# Patient Record
Sex: MALE | Race: WHITE | URBAN - METROPOLITAN AREA
[De-identification: names, ages, dates, MRNs, and addresses within clinical notes are randomized per-mention and may not be internally consistent; named-entity substitution may affect disease eponyms.]

---

## 2017-01-14 ENCOUNTER — GENERIC CONVERSION - ENCOUNTER (OUTPATIENT)
Dept: OTHER | Facility: OTHER | Age: 67
End: 2017-01-14

## 2017-04-07 ENCOUNTER — GENERIC CONVERSION - ENCOUNTER (OUTPATIENT)
Dept: OTHER | Facility: OTHER | Age: 67
End: 2017-04-07

## 2017-04-07 ENCOUNTER — ALLSCRIPTS OFFICE VISIT (OUTPATIENT)
Dept: OTHER | Facility: OTHER | Age: 67
End: 2017-04-07

## 2017-04-08 LAB
A/G RATIO (HISTORICAL): 2.5 (ref 1.2–2.2)
ALBUMIN SERPL BCP-MCNC: 4.5 G/DL (ref 3.6–4.8)
ALP SERPL-CCNC: 72 IU/L (ref 39–117)
ALT SERPL W P-5'-P-CCNC: 15 IU/L (ref 0–44)
AST SERPL W P-5'-P-CCNC: 14 IU/L (ref 0–40)
BILIRUB SERPL-MCNC: 0.4 MG/DL (ref 0–1.2)
BUN SERPL-MCNC: 13 MG/DL (ref 8–27)
BUN/CREA RATIO (HISTORICAL): 16 (ref 10–24)
CALCIUM SERPL-MCNC: 9.2 MG/DL (ref 8.6–10.2)
CHLORIDE SERPL-SCNC: 104 MMOL/L (ref 96–106)
CHOLEST SERPL-MCNC: 192 MG/DL (ref 100–199)
CO2 SERPL-SCNC: 19 MMOL/L (ref 18–29)
CREAT SERPL-MCNC: 0.83 MG/DL (ref 0.76–1.27)
EGFR AFRICAN AMERICAN (HISTORICAL): 105 ML/MIN/1.73
EGFR-AMERICAN CALC (HISTORICAL): 90 ML/MIN/1.73
GLUCOSE SERPL-MCNC: 99 MG/DL (ref 65–99)
HDLC SERPL-MCNC: 52 MG/DL
LDLC SERPL CALC-MCNC: 124 MG/DL (ref 0–99)
POTASSIUM SERPL-SCNC: 4.2 MMOL/L (ref 3.5–5.2)
SODIUM SERPL-SCNC: 142 MMOL/L (ref 134–144)
TOT. GLOBULIN, SERUM (HISTORICAL): 1.8 G/DL (ref 1.5–4.5)
TOTAL PROTEIN (HISTORICAL): 6.3 G/DL (ref 6–8.5)
TRIGL SERPL-MCNC: 78 MG/DL (ref 0–149)
VLDLC SERPL CALC-MCNC: 16 MG/DL (ref 5–40)

## 2017-04-11 LAB
EST. AVERAGE GLUCOSE BLD GHB EST-MCNC: 117 MG/DL
HBA1C MFR BLD HPLC: 5.7 %HB

## 2017-04-12 LAB
BASOPHILS # BLD AUTO: NORMAL %
BASOPHILS # BLD AUTO: NORMAL X10E3/UL
DEPRECATED RDW RBC AUTO: 14.1 % (ref 12.3–15.4)
DIFF COMMENT (HISTORICAL): NORMAL
EOSINOPHIL # BLD AUTO: NORMAL %
EOSINOPHIL # BLD AUTO: NORMAL X10E3/UL
HCT VFR BLD AUTO: 38.2 % (ref 37.5–51)
HGB BLD-MCNC: 13.3 G/DL (ref 12.6–17.7)
LYMPHOCYTES # BLD AUTO: NORMAL %
LYMPHOCYTES # BLD AUTO: NORMAL X10E3/UL
MCH RBC QN AUTO: 30.2 PG (ref 26.6–33)
MCHC RBC AUTO-ENTMCNC: 34.8 G/DL (ref 31.5–35.7)
MCV RBC AUTO: 87 FL (ref 79–97)
MONOCYTES # BLD AUTO: NORMAL X10E3/UL
MONOCYTES (HISTORICAL): NORMAL %
NEUTROPHILS # BLD AUTO: NORMAL %
NEUTROPHILS # BLD AUTO: NORMAL X10E3/UL
PLATELET # BLD AUTO: 269 X10E3/UL (ref 150–379)
PLATELET COMMENT (HISTORICAL): NORMAL
QUESTION/PROBLEM (HISTORICAL): NORMAL
RBC (HISTORICAL): 4.4 X10E6/UL (ref 4.14–5.8)
RBC COMMENT (HISTORICAL): NORMAL
WBC # BLD AUTO: 4.8 X10E3/UL (ref 3.4–10.8)

## 2017-08-11 ENCOUNTER — ALLSCRIPTS OFFICE VISIT (OUTPATIENT)
Dept: OTHER | Facility: OTHER | Age: 67
End: 2017-08-11

## 2017-12-22 ENCOUNTER — LAB REQUISITION (OUTPATIENT)
Dept: LAB | Facility: HOSPITAL | Age: 67
End: 2017-12-22
Payer: COMMERCIAL

## 2017-12-22 ENCOUNTER — ALLSCRIPTS OFFICE VISIT (OUTPATIENT)
Dept: OTHER | Facility: OTHER | Age: 67
End: 2017-12-22

## 2017-12-22 DIAGNOSIS — R73.9 HYPERGLYCEMIA: ICD-10-CM

## 2017-12-22 DIAGNOSIS — Z12.5 ENCOUNTER FOR SCREENING FOR MALIGNANT NEOPLASM OF PROSTATE: ICD-10-CM

## 2017-12-22 DIAGNOSIS — I10 ESSENTIAL (PRIMARY) HYPERTENSION: ICD-10-CM

## 2017-12-22 DIAGNOSIS — E78.49 OTHER HYPERLIPIDEMIA: ICD-10-CM

## 2017-12-22 LAB
ALBUMIN SERPL BCP-MCNC: 4.2 G/DL (ref 3.5–5)
ALP SERPL-CCNC: 72 U/L (ref 46–116)
ALT SERPL W P-5'-P-CCNC: 23 U/L (ref 12–78)
ANION GAP SERPL CALCULATED.3IONS-SCNC: 6 MMOL/L (ref 4–13)
AST SERPL W P-5'-P-CCNC: 15 U/L (ref 5–45)
BILIRUB SERPL-MCNC: 0.69 MG/DL (ref 0.2–1)
BUN SERPL-MCNC: 13 MG/DL (ref 5–25)
CALCIUM SERPL-MCNC: 9.1 MG/DL (ref 8.3–10.1)
CHLORIDE SERPL-SCNC: 107 MMOL/L (ref 100–108)
CHOLEST SERPL-MCNC: 180 MG/DL (ref 50–200)
CO2 SERPL-SCNC: 27 MMOL/L (ref 21–32)
CREAT SERPL-MCNC: 0.88 MG/DL (ref 0.6–1.3)
EST. AVERAGE GLUCOSE BLD GHB EST-MCNC: 131 MG/DL
GFR SERPL CREATININE-BSD FRML MDRD: 88 ML/MIN/1.73SQ M
GLUCOSE SERPL-MCNC: 103 MG/DL (ref 65–140)
HBA1C MFR BLD: 6.2 % (ref 4.2–6.3)
HDLC SERPL-MCNC: 58 MG/DL (ref 40–60)
LDLC SERPL CALC-MCNC: 108 MG/DL (ref 0–100)
POTASSIUM SERPL-SCNC: 4.7 MMOL/L (ref 3.5–5.3)
PROT SERPL-MCNC: 7.1 G/DL (ref 6.4–8.2)
PSA SERPL-MCNC: 0.7 NG/ML (ref 0–4)
SODIUM SERPL-SCNC: 140 MMOL/L (ref 136–145)
TRIGL SERPL-MCNC: 70 MG/DL
TSH SERPL DL<=0.05 MIU/L-ACNC: 1.19 UIU/ML (ref 0.36–3.74)

## 2017-12-22 PROCEDURE — 80053 COMPREHEN METABOLIC PANEL: CPT | Performed by: FAMILY MEDICINE

## 2017-12-22 PROCEDURE — G0103 PSA SCREENING: HCPCS | Performed by: FAMILY MEDICINE

## 2017-12-22 PROCEDURE — 83036 HEMOGLOBIN GLYCOSYLATED A1C: CPT | Performed by: FAMILY MEDICINE

## 2017-12-22 PROCEDURE — 80061 LIPID PANEL: CPT | Performed by: FAMILY MEDICINE

## 2017-12-22 PROCEDURE — 84443 ASSAY THYROID STIM HORMONE: CPT | Performed by: FAMILY MEDICINE

## 2017-12-23 NOTE — PROGRESS NOTES
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)   2  Benign essential hypertension (401 1) (I10)   3  Elevated blood sugar (790 29) (R73 9)    Plan   Benign essential hypertension, Elevated blood sugar, Encounter for prostate cancer    screening, Familial combined hyperlipidemia    · (1) PSA (SCREEN) (Dx V76 44 Screen for Prostate Cancer); Status:Active; Requested    for:01Yhm0746;   Benign essential hypertension, Elevated blood sugar, Familial combined hyperlipidemia    · (1) COMPREHENSIVE METABOLIC PANEL; Status:Active; Requested for:80Irk1050;    · (1) HEMOGLOBIN A1C; Status:Active; Requested for:56Mop4404;    · (1) LIPID PANEL FASTING W DIRECT LDL REFLEX; Status:Active; Requested    for:97Lfr8418;    · (1) TSH; Status:Active; Requested for:88Npx5960;   Need for vaccination    · Pneumo (Pneumovax); INJECT 0 5  ML Intramuscular; To Be Done:    71Mer4491  Screening for depression    · *VB-Depression Screening; Status:Complete - Retrospective By Protocol Authorization;      Done: 94OZL5629 10:45AM  Screening for other and unspecified genitourinary condition    · *VB - Urinary Incontinence Screen (Dx Z13 89 Screen for UI); Status:Complete -    Retrospective By Protocol Authorization;   Done: 78YUB9464 10:45AM  Special screening for other neurological conditions    · *VB - Fall Risk Assessment  (Dx Z13 89 Screen for Neurologic Disorder);    Status:Complete - Retrospective By Protocol Authorization;   Done: 74FLX7462 10:44AM    Chief Complaint   Pt presents for 6 month check up and FBW  Pt would like flu shot today  Pt is UTD with Colonoscopy  Patient is here today for follow up of chronic conditions described in HPI  History of Present Illness   Patient presents for routine follow-up of chronic medical problems which include hypertension, hyperlipidemia, and elevated blood glucose   Patient takes amlodipine and lisinopril for his blood pressure, atorvastatin for his lipids and Wellbutrin for smoking cessation  He is on no medication for blood sugar  He is compliant with his medications and has no side effects related to them  Review of Systems      Over the past 2 weeks, how often have you been bothered by the following problems? 1 ) Little interest or pleasure in doing things? Not at all       2 ) Feeling down, depressed or hopeless? Not at all       3 ) Trouble falling asleep or sleeping too much? Not at all       4 ) Feeling tired or having little energy? Not at all       5 ) Poor appetite or overeating? Not at all       6 ) Feeling bad about yourself, or that you are a failure, or have let yourself or your family down? Not at all       7 ) Trouble concentrating on things, such as reading a newspaper or watching television? Not at all       8 ) Moving or speaking so slowly that other people could have noticed, or the opposite, moving or speaking faster than usual? Not at all       9 ) Thoughts that you would be better off dead or of hurting yourself in some way? 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)   5  Flu vaccine need (V04 81) (Z23)   6  Multiple contusions (924 8) (T07  XXXA)   7  Smoking trying to quit (305 1) (Z72 0)    Past Medical History   1  History of Abnormal blood chemistry (790 6) (R79 9)   2  History of Anal Fistula (565 1)   3   History of Encounter for prostate cancer screening (V76 44) (Z12 5)   4  History of dermatitis (V13 3) (Z87 2)   5  History of influenza vaccination (V49 89) (Z92 29)   6  History of sciatica (V12 49) (Z86 69)   7  History of Limb pain (729 5) (M79 609)   8  History of Medicare annual wellness visit, initial (V70 0) (Z00 00)   9  History of Medicare annual wellness visit, subsequent (V70 0) (Z00 00)   10  History of Muscle weakness (728 87) (M62 81)   11  History of Neck pain (723 1) (M54 2)   12  History of Need for pneumococcal vaccination (V03 82) (Z23)   13  History of Need for revaccination (V05 9) (Z23)   14  History of Need for revaccination (V05 9) (Z23)   15  History of Open wound of left foot, initial encounter (892 0) (S91 302A)   16  History of Open wound of left foot, subsequent encounter (V58 89,892 0) (S91 302D)   17  History of Other muscle spasm (728 85) (M62 838)   18  History of Screening for colon cancer (V76 51) (Z12 11)   19  History of Screening for colon cancer (V76 51) (Z12 11)   20  History of Screening for depression (V79 0) (Z13 89)   21  History of Screening for depression (V79 0) (Z13 89)   22  History of Screening for depression (V79 0) (Z13 89)   23  History of Screening for genitourinary condition (V81 6) (Z13 89)   24  History of Screening for neurological condition (V80 09) (Z13 89)   25  History of Special screening for other neurological conditions (V80 09) (Z13 89)   26  History of Visit for pre-operative examination (V72 84) (B28 725)     The active problems and past medical history were reviewed and updated today  Family History   Mother    1  No pertinent family history  Father    2  No pertinent family history  Child    3  No pertinent family history    Social History    · Always uses seat belt   · Feels safe at home   · Never A Smoker   · Smoking trying to quit (305 1) (Z72 0)   · Social alcohol use (Z78 9)  The social history was reviewed and updated today   The social history was reviewed and is unchanged  Current Meds    1  AmLODIPine Besylate 5 MG Oral Tablet; Take 1 tablet daily; Therapy: 66MEU6249 to (Hailey Soria)  Requested for: 49ENS6601; Last     VR:48ZBI1004 Ordered   2  Atorvastatin Calcium 10 MG Oral Tablet; Take 1 tablet daily; Therapy: 30OIJ2793 to (Evaluate:92Qal4796)  Requested for: 35YHI1351; Last     Rx:28Feu0624 Ordered   3  BuPROPion HCl - 75 MG Oral Tablet; TAKE 1 TABLET TWICE DAILY; Therapy: 17HCS2323 to (Evaluate:42Wdj6705)  Requested for: 29UBG3086; Last     Rx:03Gon0320 Ordered   4  Lisinopril 5 MG Oral Tablet; TAKE 1 TABLET DAILY; Therapy: 47XFJ2357 to (Hailey Soria)  Requested for: 96YHO7528; Last     ZX:50BCP3781 Ordered     The medication list was reviewed and updated today  Allergies   1  No Known Drug Allergies    Vitals   Vital Signs    Recorded: 06Cry9696 10:39AM   Temperature 97 5 F, Tympanic   Heart Rate 75   Pulse Quality Normal   Respiration Quality Normal   Respiration 16   Systolic 785, LUE, Sitting   Diastolic 80, LUE, Sitting   BP CUFF SIZE Large   Height 5 ft 7 72 in   Weight 254 lb 7 oz   BMI Calculated 39 01   BSA Calculated 2 26   O2 Saturation 98, RA   Pain Scale 0     Physical Exam        Constitutional      General appearance: No acute distress, well appearing and well nourished  Head and Face      Head and face: Normal        Eyes      Conjunctiva and lids: No erythema, swelling or discharge  Pupils and irises: Equal, round, reactive to light  Ears, Nose, Mouth, and Throat      External inspection of ears and nose: Normal        Otoscopic examination: Tympanic membranes translucent with normal light reflex  Canals patent without erythema  Hearing: Normal        Nasal mucosa, septum, and turbinates: Normal without edema or erythema  Lips, teeth, and gums: Normal, good dentition  Oropharynx: Normal with no erythema, edema, exudate or lesions         Neck      Neck: Supple, symmetric, trachea midline, no masses  Thyroid: Normal, no thyromegaly  Pulmonary      Respiratory effort: No increased work of breathing or signs of respiratory distress  Auscultation of lungs: Clear to auscultation  Cardiovascular      Auscultation of heart: Normal rate and rhythm, normal S1 and S2, no murmurs  Examination of extremities for edema and/or varicosities: Normal        Abdomen      Abdomen: Non-tender, no masses  Lymphatic      Palpation of lymph nodes in neck: No lymphadenopathy  Musculoskeletal      Gait and station: Normal        Inspection/palpation of digits and nails: Normal without clubbing or cyanosis  Inspection/palpation of joints, bones, and muscles: Normal        Range of motion: Normal        Stability: Normal        Muscle strength/tone: Normal        Neurologic      Cranial nerves: Cranial nerves 2-12 intact  Psychiatric      Judgment and insight: Normal        Orientation to person, place and time: Normal        Recent and remote memory: Intact         Mood and affect: Normal        Results/Data   *VB - Urinary Incontinence Screen (Dx Z13 89 Screen for UI) 63Hlg8900 10:45AM Louellen Cerna      Test Name Result Flag Reference   Urinary Incontinence Assessment 64GPQ9055        *VB-Depression Screening 76HPO2213 10:45AM Louellen Cerna      Test Name Result Flag Reference   Depression Scale Result      Depression Screen - Negative For Symptoms      *VB - Fall Risk Assessment  (Dx Z13 89 Screen for Neurologic Disorder) 90Xsr1488 10:44AM Louellen Cerna      Test Name Result Flag Reference   Falls Risk      No falls in the past year        Signatures    Electronically signed by : Saray Chairez DO; Dec 22 2017 11:14AM EST                       (Author)

## 2017-12-26 ENCOUNTER — GENERIC CONVERSION - ENCOUNTER (OUTPATIENT)
Dept: OTHER | Facility: OTHER | Age: 67
End: 2017-12-26

## 2018-01-10 NOTE — PROGRESS NOTES
Assessment    1  Open wound of left foot, subsequent encounter (V58 89,892 0) (B41 174Z)    Discussion/Summary    Patient is here to recheck his wound on his left heel  It is totally closed, just a little pink  Ok to leave open to air, but recommend he always wear a sock in his shoe  The treatment plan was reviewed with the patient/guardian  The patient/guardian understands and agrees with the treatment plan      Chief Complaint    1  Foot Problem  pt here for a f/u to the wound on his left foot  states it is better  History of Present Illness  Patient is here to check wound on left heel  Catheryn Files presents with complaints of foot problem  Associated symptoms include foot skin lesions  Review of Systems    Constitutional: No fever or chills, feels well, no tiredness, no recent weight gain or weight loss  Integumentary: No complaints of skin rash or skin lesions, no itching, no skin wound, no dry skin  Active Problems    1  Benign essential hypertension (401 1) (I10)   2  Benign prostatic hypertrophy (600 00) (N40 0)   3  Elevated blood sugar (790 29) (R73 09)   4  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)   5  Multiple contusions (924 8) (T14 8)   6  Open wound of left foot, initial encounter (892 0) (S91 302A)   7  Open wound of left foot, subsequent encounter (V58 89,892 0) (V81 759Q)    Past Medical History    1  History of Abnormal blood chemistry (790 6) (R79 9)   2  History of Anal Fistula (565 1)   3  History of Encounter for prostate cancer screening (V76 44) (Z12 5)   4  History of dermatitis (V13 3) (Z87 2)   5  History of sciatica (V12 49) (Z86 69)   6  History of Medicare annual wellness visit, initial (V70 0) (Z00 00)   7  History of Muscle weakness (728 87) (M62 81)   8  History of Need for pneumococcal vaccination (V03 82) (Z23)   9  History of Other muscle spasm (728 85) (M62 838)   10  History of Screening for colon cancer (V76 51) (Z12 11)   11   History of Screening for depression (V79 0) (Z13 89)   12  History of Screening for neurological condition (V80 09) (Z13 89)   13  History of Visit for pre-operative examination (V72 84) (V92 641)    The active problems and past medical history were reviewed and updated today  Family History    1  No pertinent family history    2  No pertinent family history    Social History    · Never A Smoker  The social history was reviewed and updated today  The social history was reviewed and is unchanged  Current Meds   1  AmLODIPine Besylate 5 MG Oral Tablet; Take 1 tablet daily; Therapy: 12SFJ3017 to (Yuniel Garcia)  Requested for: 44LPI6345; Last   Rx:01Nov2015 Ordered   2  Atorvastatin Calcium 10 MG Oral Tablet; Take 1 tablet daily; Therapy: 35XAF6021 to (Evaluate:29Apr2016)  Requested for: 03NHC5532; Last   Rx:01Nov2015 Ordered   3  Mupirocin Calcium 2 % External Cream;   Therapy: 61KQJ5426 to (Last Rx:86Tva5296)  Requested for: 08ZVY9896 Ordered    The medication list was reviewed and updated today  Allergies    1  No Known Drug Allergies    Vitals  Vital Signs [Data Includes: Current Encounter]    Recorded: 27HGH4672 01:13PM   Temperature 98 3 F, Tympanic   Heart Rate 87   Respiration 18   Systolic 707   Diastolic 88   Height 5 ft 6 5 in   Weight 253 lb 8 96 oz   BMI Calculated 40 31   BSA Calculated 2 22   O2 Saturation 97     Physical Exam    Constitutional   General appearance: No acute distress, well appearing and well nourished  Skin   Skin and subcutaneous tissue: Abnormal   Wound is closed, but still pink     Psychiatric   Orientation to person, place and time: Normal     Mood and affect: Normal          Future Appointments    Date/Time Provider Specialty Site   01/28/2016 06:00 PM Rik Garcia   02/12/2016 12:45 PM Phylicia Cuello DO Family Medicine [address]     Signatures   Electronically signed by : James Richmond DO; Francisco Javier 15 2016  1:40PM EST                       (Author)

## 2018-01-10 NOTE — MISCELLANEOUS
Message  I tried to call patient regarding a note that he left for me yesterday at the office  He had seen a psychiatrist with the [facility] in [place]  They prescribed bupropion for his anxiety  He wanted to check if I was okay with him taking this medication  I did leave a message on his voicemail that I am okay with this medication but I will try to call him again on Tuesday, I am not in the office tomorrow        Signatures   Electronically signed by : Mandie Bowden DO; Dec  4 2016  1:20PM EST                       (Author)

## 2018-01-12 VITALS
WEIGHT: 246.25 LBS | TEMPERATURE: 96.1 F | HEIGHT: 68 IN | DIASTOLIC BLOOD PRESSURE: 70 MMHG | BODY MASS INDEX: 37.32 KG/M2 | HEART RATE: 62 BPM | SYSTOLIC BLOOD PRESSURE: 118 MMHG | RESPIRATION RATE: 17 BRPM | OXYGEN SATURATION: 98 %

## 2018-01-14 VITALS
SYSTOLIC BLOOD PRESSURE: 122 MMHG | TEMPERATURE: 99 F | HEART RATE: 74 BPM | OXYGEN SATURATION: 98 % | WEIGHT: 248.06 LBS | BODY MASS INDEX: 39.87 KG/M2 | HEIGHT: 66 IN | RESPIRATION RATE: 16 BRPM | DIASTOLIC BLOOD PRESSURE: 80 MMHG
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
## 2018-01-14 NOTE — RESULT NOTES
Verified Results  ([facility] [facility]) Lipid Panel With LDL/HDL Ratio 52PXE1002 12:00AM Jose Juan Garcia     Test Name Result Flag Reference   Cholesterol, Total 156 mg/dL  100-199   Triglycerides 63 mg/dL  0-149   HDL Cholesterol 57 mg/dL  >39   According to ATP-III Guidelines, HDL-C >59 mg/dL is considered a  negative risk factor for CHD  VLDL Cholesterol Delon 13 mg/dL  5-40   LDL Cholesterol Calc 86 mg/dL  0-99   LDL/HDL Ratio 1 5 ratio units  0 0-3 6   LDL/HDL Ratio                                                             Men  Women                                               1/2 Avg  Risk  1 0    1 5                                                   Avg Risk  3 6    3 2                                                2X Avg  Risk  6 2    5 0                                                3X Avg  Risk  8 0    6 1

## 2018-01-17 NOTE — RESULT NOTES
Verified Results  (1) COMPREHENSIVE METABOLIC PANEL 43SCS6140 56:25ZD Eliana Guardado     Test Name Result Flag Reference   Glucose, Serum 124 mg/dL H 65-99   BUN 11 mg/dL  8-27   Creatinine, Serum 0 82 mg/dL  0 76-1 27   eGFR If NonAfricn Am 92 mL/min/1 73  >59   eGFR If Africn Am 106 mL/min/1 73  >59   BUN/Creatinine Ratio 13  10-22   Sodium, Serum 139 mmol/L  134-144   Potassium, Serum 4 3 mmol/L  3 5-5 2   Chloride, Serum 103 mmol/L     Carbon Dioxide, Total 19 mmol/L  18-29   Calcium, Serum 9 3 mg/dL  8 6-10 2   Protein, Total, Serum 6 9 g/dL  6 0-8 5   Albumin, Serum 4 7 g/dL  3 6-4 8   Globulin, Total 2 2 g/dL  1 5-4 5   A/G Ratio 2 1  1 1-2 5   Bilirubin, Total 0 5 mg/dL  0 0-1 2   Alkaline Phosphatase, S 80 IU/L     AST (SGOT) 13 IU/L  0-40   ALT (SGPT) 15 IU/L  0-44     ([facility]) Lipid Panel 65EME8415 12:00AM Eliana Guardado     Test Name Result Flag Reference   Cholesterol, Total 194 mg/dL  100-199   Triglycerides 70 mg/dL  0-149   HDL Cholesterol 54 mg/dL  >39   According to ATP-III Guidelines, HDL-C >59 mg/dL is considered a  negative risk factor for CHD     VLDL Cholesterol Delon 14 mg/dL  5-40   LDL Cholesterol Calc 126 mg/dL H 0-99

## 2018-01-22 VITALS
TEMPERATURE: 97.5 F | WEIGHT: 254.44 LBS | RESPIRATION RATE: 16 BRPM | OXYGEN SATURATION: 98 % | BODY MASS INDEX: 38.56 KG/M2 | HEIGHT: 68 IN | DIASTOLIC BLOOD PRESSURE: 80 MMHG | SYSTOLIC BLOOD PRESSURE: 116 MMHG | HEART RATE: 75 BPM

## 2018-01-23 NOTE — PROGRESS NOTES
Assessment    1  Encounter for preventive health examination (V70 0) (Z00 00)    Plan  Benign essential hypertension, Elevated blood sugar, Encounter for prostate cancer  screening, Familial combined hyperlipidemia    · (1) PSA (SCREEN) (Dx V76 44 Screen for Prostate Cancer); Status:Active; Requested  for:46Edo5076;   Benign essential hypertension, Elevated blood sugar, Familial combined hyperlipidemia    · (1) COMPREHENSIVE METABOLIC PANEL; Status:Active; Requested for:34Voz4255;    · (1) HEMOGLOBIN A1C; Status:Active; Requested for:14Uho3892;    · (1) LIPID PANEL FASTING W DIRECT LDL REFLEX; Status:Active; Requested  for:94Zzm5352;    · (1) TSH; Status:Active; Requested for:94Exz2222;   Need for vaccination    · Pneumo (Pneumovax); INJECT 0 5  ML Intramuscular; To Be Done:  02Xkt2106  Screening for depression    · *VB-Depression Screening; Status:Complete - Retrospective By Protocol Authorization;    Done: 78LBX9202 10:45AM  Screening for other and unspecified genitourinary condition    · *VB - Urinary Incontinence Screen (Dx Z13 89 Screen for UI); Status:Complete -  Retrospective By Protocol Authorization;   Done: 11FKN9780 10:45AM  Special screening for other neurological conditions    · *VB - Fall Risk Assessment  (Dx Z13 89 Screen for Neurologic Disorder);  Status:Complete - Retrospective By Protocol Authorization;   Done: 02FVJ8586 10:44AM    Discussion/Summary    Reviewed patient's annual wellness visit questionnaire  No new issues or concerns arising from that  Up-to-date on immunization except for pneumococcal  Will give that today to complete his lifetime series  Colonoscopy less than 10 years ago  Due for PSA which will be drawn today with other fasting lab work to include CMP lipid and chemistry panel  Impression: Subsequent Annual Wellness Visit, with preventive exam as well as age and risk appropriate counseling completed       Cardiovascular screening and counseling: the risks and benefits of screening were discussed  Diabetes screening and counseling: due for blood glucose  Colorectal cancer screening and counseling: screening is current  Prostate cancer screening and counseling: due for PSA  Osteoporosis screening and counseling: screening not indicated  Abdominal aortic aneurysm screening and counseling: screening not indicated  Glaucoma screening and counseling: screening is current  HIV screening and counseling: screening not indicated  Immunizations: influenza vaccine is up to date this year, pneumococcal vaccine due today, hepatitis B vaccination series is not indicated at this time due to the patient's low risk of giovanni the disease, Zostavax vaccination status is unknown, Td vaccination up to date and Tdap vaccination up to date  Advance Directive Planning: not complete, paperwork and instructions were given to the patient, he was encouraged to follow-up with me to discuss his questions and/or decisions  Patient Discussion: plan discussed with the patient  Chief Complaint  Pt presents for AWV  History of Present Illness  HPI: Patient presents for annual wellness visit for Medicare  He completed his questionnaire  There are no new issues or concerns arising from that  Overall he feels his health is good  He is seen here for his chronic conditions and he also is seen at the [place] at least 1-2 times per year  Welcome to [employer] and Wellness Visits: The patient is being seen for the subsequent annual wellness visit  Medicare Screening and Risk Factors   Hospitalizations: no previous hospitalizations  Once per lifetime medicare screening tests: ECG has not been done and AAA screening US has not yet been done  Medicare Screening Tests Risk Questions   Abdominal aortic aneurysm risk assessment: none indicated  Osteoporosis risk assessment:  and over 48years of age, but none indicated  HIV risk assessment: none indicated  Drug and Alcohol Use:  The patient has never smoked cigarettes and has never used smokeless tobacco  The patient reports occasional alcohol use and drinking 1 drinks per week  Alcohol concern:   The patient has no concerns about alcohol abuse  He has declined alcohol treatment  He has never used illicit drugs  He has declined drug treatment  Diet and Physical Activity: Current diet includes well balanced meals, 2 servings of fruit per day, 2 servings of vegetables per day, 2 servings of meat per day, 1 servings of whole grains per day, 2 servings of dairy products per day, 2 cups of coffee per day, 0 cups of tea per day, 0 cans of regular soda per day, 0 cans of diet soda per day and water  He exercises infrequently  Exercise: walking 20 minutes per day  Mood Disorder and Cognitive Impairment Screening: PHQ-9 Depression Scale   Over the past 2 weeks, how often have you been bothered by the following problems? 1 ) Little interest or pleasure in doing things? Not at all    2 ) Feeling down, depressed or hopeless? Not at all    3 ) Trouble falling asleep or sleeping too much? Not at all    4 ) Feeling tired or having little energy? Not at all    5 ) Poor appetite or overeating? Not at all    6 ) Feeling bad about yourself, or that you are a failure, or have let yourself or your family down? Not at all    7 ) Trouble concentrating on things, such as reading a newspaper or watching television? Not at all    8 ) Moving or speaking so slowly that other people could have noticed, or the opposite, moving or speaking faster than usual? Not at all  TOTAL SCORE: 0    How difficult have these problems made it for you to do your work, take care of things at home, or get along with people? Not at all  Depression screening  negative for symptoms  He denies feeling down, depressed, or hopeless over the past two weeks  He denies feeling little interest or pleasure in doing things over the past two weeks     Cognitive impairment screening: denies difficulty learning/retaining new information, denies difficulty handling complex tasks, denies difficulty with reasoning, denies difficulty with spatial ability and orientation, denies difficulty with language and denies difficulty with behavior  Functional Ability/Level of Safety: Hearing is normal bilaterally, normal in the right ear and normal in the left ear  He denies hearing difficulties  He does not use a hearing aid  The patient is currently able to do activities of daily living without limitations, able to do instrumental activities of daily living without limitations, able to participate in social activities without limitations and able to drive without limitations  Activities of daily living details: does not need help using the phone, no transportation help needed, does not need help shopping, no meal preparation help needed, does not need help doing housework, does not need help doing laundry, does not need help managing medications and does not need help managing money  Fall risk factors: The patient fell 0 times in the past 12 months  Injury History: no polypharmacy, alcohol use, no mobility impairment, no antidepressant use, no deconditioning, no postural hypotension, no sedative use, no visual impairment, no urinary incontinence, no antihypertensive use, no cognitive impairment, up and go test was normal and no previous fall  Home safety risk factors:  no unfamiliar surroundings, no loose rugs, no poor household lighting, no uneven floors, no household clutter, grab bars in the bathroom and handrails on the stairs  Advance Directives: Advance directives: no living will and no advance directives  Co-Managers and Medical Equipment/Suppliers: See Patient Care Team   Reviewed Updated [facility]:   Last Medicare Wellness Visit Information was reviewed, patient interviewed and updates made to the record today     Preventive Quality Program 65 and Older: Falls Risk: The patient fell 0 times in the past 12 months  The patient is currently asymptomatic Symptoms Include:  Associated symptoms:  No associated symptoms are reported  The patient currently has no urinary incontinence symptoms  Review of Systems    Constitutional: negative  Eyes: negative  ENT: negative  Cardiovascular: negative  Respiratory: negative  Gastrointestinal: negative  Genitourinary: negative  Musculoskeletal: negative  Integumentary and Breasts: negative  Neurological: negative  Psychiatric: negative  Endocrine: negative  Hematologic and Lymphatic: negative  Over the past 2 weeks, how often have you been bothered by the following problems? 1 ) Little interest or pleasure in doing things? Not at all    2 ) Feeling down, depressed or hopeless? Not at all    3 ) Trouble falling asleep or sleeping too much? Not at all    4 ) Feeling tired or having little energy? Not at all    5 ) Poor appetite or overeating? Not at all    6 ) Feeling bad about yourself, or that you are a failure, or have let yourself or your family down? Not at all    7 ) Trouble concentrating on things, such as reading a newspaper or watching television? Not at all    8 ) Moving or speaking so slowly that other people could have noticed, or the opposite, moving or speaking faster than usual? Not at all    9 ) Thoughts that you would be better off dead or of hurting yourself in some way? Not at all  Score 0      Active Problems    1  Benign essential hypertension (401 1) (I10)   2  Elevated blood sugar (790 29) (R73 9)   3  Enlarged prostate without lower urinary tract symptoms (luts) (600 00) (N40 0)   4  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)   5  Flu vaccine need (V04 81) (Z23)   6  Multiple contusions (924 8) (T07  XXXA)   7   Smoking trying to quit (305 1) (Z72 0)    Past Medical History    · History of Abnormal blood chemistry (790 6) (R79 9)   · History of Anal Fistula (565 1)   · History of Encounter for prostate cancer screening (V76 44) (Z12 5)   · History of dermatitis (V13 3) (Z87 2)   · History of influenza vaccination (V49 89) (Z92 29)   · History of sciatica (V12 49) (Z86 69)   · History of Limb pain (729 5) (M79 609)   · History of Medicare annual wellness visit, initial (V70 0) (Z00 00)   · History of Medicare annual wellness visit, subsequent (V70 0) (Z00 00)   · History of Muscle weakness (728 87) (M62 81)   · History of Neck pain (723 1) (M54 2)   · History of Need for pneumococcal vaccination (V03 82) (Z23)   · History of Need for revaccination (V05 9) (Z23)   · History of Need for revaccination (V05 9) (Z23)   · History of Open wound of left foot, initial encounter (892 0) (S91 302A)   · History of Open wound of left foot, subsequent encounter (V58 89,892 0) (S91 302D)   · History of Other muscle spasm (728 85) (Z29 033)   · History of Screening for colon cancer (V76 51) (Z12 11)   · History of Screening for colon cancer (V76 51) (Z12 11)   · History of Screening for depression (V79 0) (Z13 89)   · History of Screening for depression (V79 0) (Z13 89)   · History of Screening for depression (V79 0) (Z13 89)   · History of Screening for genitourinary condition (V81 6) (Z13 89)   · History of Screening for neurological condition (V80 09) (Z13 89)   · History of Special screening for other neurological conditions (V80 09) (Z13 89)   · History of Visit for pre-operative examination (V72 84) (M27 245)    The active problems and past medical history were reviewed and updated today  Family History  Mother    · No pertinent family history  Father    · No pertinent family history  Child    · No pertinent family history    Social History    · Always uses seat belt   · Feels safe at home   · Never A Smoker   · Smoking trying to quit (305 1) (Z72 0)   · Social alcohol use (Z78 9)  The social history was reviewed and updated today  The social history was reviewed and is unchanged  Current Meds   1   AmLODIPine Besylate 5 MG Oral Tablet; Take 1 tablet daily; Therapy: 25HYC0791 to (Ke Harrington)  Requested for: 16AJT1086; Last   PO:86HDA5381 Ordered   2  Atorvastatin Calcium 10 MG Oral Tablet; Take 1 tablet daily; Therapy: 25MPN1332 to (Evaluate:20Oxd8120)  Requested for: 17AKS0990; Last   Rx:28Nov2017 Ordered   3  BuPROPion HCl - 75 MG Oral Tablet; TAKE 1 TABLET TWICE DAILY; Therapy: 49YTU6707 to (Evaluate:11Fsv2988)  Requested for: 38QLU4837; Last   Rx:02Vql0419 Ordered   4  Lisinopril 5 MG Oral Tablet; TAKE 1 TABLET DAILY; Therapy: 38UOU7885 to (Ke Harrington)  Requested for: 06RUN6109; Last   HK:75QLE1156 Ordered    The medication list was reviewed and updated today  Allergies    1  No Known Drug Allergies    Immunizations   1 2 3    Influenza  Temporarily Deferred: Pt refuses, As of: 19POC3974, Defer for 1 Years 18-Sep-2015  (65y) 08-Dec-2017  (67y)    PCV  16-Jul-2015  (65y)      Td/DT  30-Nov-2015  (65y)      Tdap  07-Apr-2017  (67y)       Vitals  Signs    Temperature: 97 5 F, Tympanic  Heart Rate: 75  Pulse Quality: Normal  Respiration Quality: Normal  Respiration: 16  Systolic: 482, LUE, Sitting  Diastolic: 80, LUE, Sitting  BP Cuff Size: Large  Height: 5 ft 7 72 in  Weight: 254 lb 7 oz  BMI Calculated: 39 01  BSA Calculated: 2 26  O2 Saturation: 98, RA  Pain Scale: 0    Physical Exam    Constitutional   General appearance: No acute distress, well appearing and well nourished  Head and Face   Head and face: Normal     Eyes   Conjunctiva and lids: No erythema, swelling or discharge  Pupils and irises: Equal, round, reactive to light  Ears, Nose, Mouth, and Throat   External inspection of ears and nose: Normal     Otoscopic examination: Tympanic membranes translucent with normal light reflex  Canals patent without erythema  Hearing: Normal     Nasal mucosa, septum, and turbinates: Normal without edema or erythema  Lips, teeth, and gums: Normal, good dentition      Oropharynx: Normal with no erythema, edema, exudate or lesions  Neck   Neck: Supple, symmetric, trachea midline, no masses  Thyroid: Normal, no thyromegaly  Pulmonary   Respiratory effort: No increased work of breathing or signs of respiratory distress  Auscultation of lungs: Clear to auscultation  Cardiovascular   Auscultation of heart: Normal rate and rhythm, normal S1 and S2, no murmurs  Examination of extremities for edema and/or varicosities: Normal     Abdomen   Abdomen: Non-tender, no masses  Lymphatic   Palpation of lymph nodes in neck: No lymphadenopathy  Musculoskeletal   Gait and station: Normal     Inspection/palpation of digits and nails: Normal without clubbing or cyanosis  Inspection/palpation of joints, bones, and muscles: Normal     Range of motion: Normal     Stability: Normal     Muscle strength/tone: Normal     Neurologic   Cranial nerves: Cranial nerves 2-12 intact  Psychiatric   Judgment and insight: Normal     Orientation to person, place and time: Normal     Recent and remote memory: Intact      Mood and affect: Normal        Results/Data  *VB - Urinary Incontinence Screen (Dx Z13 89 Screen for UI) 60Hzc2383 10:45AM [place]     Test Name Result Flag Reference   Urinary Incontinence Assessment 52UAK6110       *VB-Depression Screening 24ZTT2950 10:45AM BeTHEVA     Test Name Result Flag Reference   Depression Scale Result      Depression Screen - Negative For Symptoms     *VB - Fall Risk Assessment  (Dx Z13 89 Screen for Neurologic Disorder) 18Cev4522 10:44AM BeTHE[facility]     Test Name Result Flag Reference   Falls Risk      No falls in the past year       Signatures   Electronically signed by : Jessee Myles DO; Dec 22 2017 11:12AM EST                       (Author)

## 2018-01-23 NOTE — RESULT NOTES
Verified Results  (1) COMPREHENSIVE METABOLIC PANEL 27EEP8521 67:93TA Cheryl Ace Order Number: [id number]     Test Name Result Flag Reference   GLUCOSE,RANDM 103 mg/dL     If the patient is fasting, the ADA then defines impaired fasting glucose as > 100 mg/dL and diabetes as > or equal to 123 mg/dL  Specimen collection should occur prior to Sulfasalazine administration due to the potential for falsely depressed results  Specimen collection should occur prior to Sulfapyridine administration due to the potential for falsely elevated results  SODIUM 140 mmol/L  136-145   POTASSIUM 4 7 mmol/L  3 5-5 3   CHLORIDE 107 mmol/L  100-108   CARBON DIOXIDE 27 mmol/L  21-32   ANION GAP (CALC) 6 mmol/L  4-13   BLOOD UREA NITROGEN 13 mg/dL  5-25   CREATININE 0 88 mg/dL  0 60-1 30   Standardized to IDMS reference method   CALCIUM 9 1 mg/dL  8 3-10 1   BILI, TOTAL 0 69 mg/dL  0 20-1 00   ALK PHOSPHATAS 72 U/L     ALT (SGPT) 23 U/L  12-78   Specimen collection should occur prior to Sulfasalazine and/or Sulfapyridine administration due to the potential for falsely depressed results  AST(SGOT) 15 U/L  5-45   Specimen collection should occur prior to Sulfasalazine administration due to the potential for falsely depressed results  ALBUMIN 4 2 g/dL  3 5-5 0   TOTAL PROTEIN 7 1 g/dL  6 4-8 2   eGFR 88 ml/min/1 73sq m     National Kidney Disease Education Program recommendations are as follows:  GFR calculation is accurate only with a steady state creatinine  Chronic Kidney disease less than 60 ml/min/1 73 sq  meters  Kidney failure less than 15 ml/min/1 73 sq  meters  (1) HEMOGLOBIN A1C 00Zbx6482 11:27AM Cheryl Ace Order Number: [id number]     Test Name Result Flag Reference   HEMOGLOBIN A1C 6 2 %  4 2-6 3   EST  AVG   GLUCOSE 131 mg/dl       (1) LIPID PANEL FASTING W DIRECT LDL REFLEX 33Djb6387 11:27AM Cheryl Ace Order Number: [id number]     Test Name Result Flag Reference   CHOLESTEROL 180 mg/dL     LDL CHOLESTEROL CALCULATED 108 mg/dL H 0-100   Triglyceride:        Normal <150 mg/dl   Borderline High 150-199 mg/dl   High 200-499 mg/dl   Very High >499 mg/dl      Cholesterol:       Desirable <200 mg/dl    Borderline High 200-239 mg/dl    High >239 mg/dl      HDL Cholesterol:       High>59 mg/dL    Low <41 mg/dL      HDL Cholesterol:       High>59 mg/dL    Low <41 mg/dL      This screening LDL is a calculated result  It does not have the accuracy of the Direct Measured LDL in the monitoring of patients with hyperlipidemia and/or statin therapy  Direct Measure LDL (EVD095) must be ordered separately in these patients  TRIGLYCERIDES 70 mg/dL  <=150   Specimen collection should occur prior to N-Acetylcysteine or Metamizole administration due to the potential for falsely depressed results  HDL,DIRECT 58 mg/dL  40-60   Specimen collection should occur prior to Metamizole administration due to the potential for falsley depressed results  (1) TSH 22Dec2017 11:27AM [place] Order Number: [id number]     Test Name Result Flag Reference   TSH 1 190 uIU/mL  0 358-3 740   Patients undergoing fluorescein dye angiography may retain small amounts of fluorescein in the body for 48-72 hours post procedure  Samples containing fluorescein can produce falsely depressed TSH values  If the patient had this procedure,a specimen should be resubmitted post fluorescein clearance       (1) PSA (SCREEN) (Dx V76 44 Screen for Prostate Cancer) 22Dec2017 11:27AM [place] Order Number: [id number]     Test Name Result Flag Reference   PROSTATE SPECIFIC ANTIGEN 0 7 ng/mL  0 0-4 0   American Urological Association Guidelines define biochemical recurrence of prostate cancer as a detectable or rising PSA value post-radical prostatectomy that is greater than or equal to 0 2 ng/mL with a second confirmatory level of greater than or equal to 0 2 ng/mL

## 2018-03-07 NOTE — PROGRESS NOTES
History of Present Illness    Revaccination   Vaccine Information: Vaccine(s) Given (names): Tenivac  Spoke with patient regarding  Action(s): Pt will be revaccinated  Appointment scheduled: [id number]  Pt called (attempt 1): [id number] [id number]  Revaccination Completed: [id number]  Active Problems    1  Enlarged prostate without lower urinary tract symptoms (luts) (600 00) (N40 0)   2  Medicare annual wellness visit, subsequent (V70 0) (Z00 00)   3  Multiple contusions (924 8) (T14 8)   4  Open wound of left foot, initial encounter (892 0) (S91 302A)   5  Open wound of left foot, subsequent encounter (V58 89,892 0) (S91 302D)   6  Screening for colon cancer (V76 51) (Z12 11)   7  Screening for depression (V79 0) (Z13 89)   8  Screening for genitourinary condition (V81 6) (Z13 89)   9  Special screening for other neurological conditions (V80 09) (Z13 89)    Immunizations  Influenza --- Sami Avila: Temporarily Deferred: Pt refuses, As of: 84HLM0747, Defer for 1 Years; Series2: 18-Sep-2015   PCV --- Sami Avila: 16-Jul-2015   Td/DT --- Series1: 30-Nov-2015     Current Meds   1  AmLODIPine Besylate 5 MG Oral Tablet; Take 1 tablet daily   2  Atorvastatin Calcium 10 MG Oral Tablet; Take 1 tablet daily   3  Lisinopril 5 MG Oral Tablet; TAKE 1 TABLET DAILY    Allergies    1   No Known Drug Allergies    Future Appointments    Date/Time Provider Specialty Site   08/11/2017 11:00 AM Doris Marr DO Family Medicine [address]   Electronically signed by : Kolton Will, ; Apr 7 2017  3:26PM EST                       (Author)

## 2018-03-09 DIAGNOSIS — I10 BENIGN ESSENTIAL HYPERTENSION: Primary | ICD-10-CM

## 2018-03-10 RX ORDER — AMLODIPINE BESYLATE 5 MG/1
TABLET ORAL
Qty: 90 TABLET | Refills: 0 | Status: SHIPPED | OUTPATIENT
Start: 2018-03-10 | End: 2018-06-29 | Stop reason: SDUPTHER

## 2018-04-28 DIAGNOSIS — I10 BENIGN ESSENTIAL HYPERTENSION: Primary | ICD-10-CM

## 2018-04-30 RX ORDER — LISINOPRIL 5 MG/1
TABLET ORAL
Qty: 90 TABLET | Refills: 0 | Status: SHIPPED | OUTPATIENT
Start: 2018-04-30 | End: 2018-08-17 | Stop reason: SDUPTHER

## 2018-06-27 RX ORDER — ATORVASTATIN CALCIUM 10 MG/1
1 TABLET, FILM COATED ORAL DAILY
COMMUNITY
Start: 2014-12-30 | End: 2018-08-15 | Stop reason: SDUPTHER

## 2018-06-27 RX ORDER — BUPROPION HYDROCHLORIDE 75 MG/1
1 TABLET ORAL 2 TIMES DAILY
COMMUNITY
Start: 2017-05-16

## 2018-06-29 DIAGNOSIS — I10 BENIGN ESSENTIAL HYPERTENSION: ICD-10-CM

## 2018-07-01 RX ORDER — AMLODIPINE BESYLATE 5 MG/1
5 TABLET ORAL DAILY
Qty: 90 TABLET | Refills: 0 | Status: SHIPPED | OUTPATIENT
Start: 2018-07-01 | End: 2018-09-27 | Stop reason: SDUPTHER

## 2018-07-02 NOTE — TELEPHONE ENCOUNTER
Call patient - I had a request for medication but he was a no show for his appt this past Friday  I did refill his medication, but he should be scheduling an appt  It has been six months since he's been seen

## 2018-07-30 ENCOUNTER — OFFICE VISIT (OUTPATIENT)
Dept: FAMILY MEDICINE CLINIC | Facility: CLINIC | Age: 68
End: 2018-07-30
Payer: COMMERCIAL

## 2018-07-30 VITALS
OXYGEN SATURATION: 93 % | HEART RATE: 83 BPM | TEMPERATURE: 97.5 F | SYSTOLIC BLOOD PRESSURE: 126 MMHG | DIASTOLIC BLOOD PRESSURE: 74 MMHG | WEIGHT: 256.2 LBS | BODY MASS INDEX: 39.28 KG/M2

## 2018-07-30 DIAGNOSIS — N45.1 EPIDIDYMITIS: Primary | ICD-10-CM

## 2018-07-30 PROCEDURE — 99213 OFFICE O/P EST LOW 20 MIN: CPT | Performed by: FAMILY MEDICINE

## 2018-07-30 RX ORDER — LEVOFLOXACIN 500 MG/1
500 TABLET, FILM COATED ORAL EVERY 24 HOURS
Qty: 7 TABLET | Refills: 0 | Status: SHIPPED | OUTPATIENT
Start: 2018-07-30 | End: 2018-07-30 | Stop reason: SDUPTHER

## 2018-07-30 RX ORDER — LEVOFLOXACIN 500 MG/1
500 TABLET, FILM COATED ORAL EVERY 24 HOURS
Qty: 7 TABLET | Refills: 0 | Status: SHIPPED | OUTPATIENT
Start: 2018-07-30 | End: 2018-08-06

## 2018-07-30 NOTE — PROGRESS NOTES
Assessment/Plan:    No problem-specific Assessment & Plan notes found for this encounter  Diagnoses and all orders for this visit:    Epididymitis  -     Discontinue: levofloxacin (LEVAQUIN) 500 mg tablet; Take 1 tablet (500 mg total) by mouth every 24 hours for 7 days  -     levofloxacin (LEVAQUIN) 500 mg tablet; Take 1 tablet (500 mg total) by mouth every 24 hours for 7 days          Subjective:      Patient ID: Regi Marquez is a [address] 89 y o  male  2-3 day history of right testicular discomfort  Patient 1st noted it upon awaking in the morning  He was unable to appreciate any mass  He did have some increased urinary frequency last night  His symptoms are slightly better today  No history of trauma        The following portions of the patient's history were reviewed and updated as appropriate: allergies, current medications, past family history, past medical history, past social history, past surgical history and problem list     Review of Systems   Genitourinary: Positive for frequency and testicular pain  Objective:      /74 (BP Location: Left arm, Patient Position: Sitting)   Pulse 83   Temp 97 5 °F (36 4 °C) (Tympanic)   Wt 116 kg (256 lb 3 2 oz)   SpO2 93%   BMI 39 28 kg/m²          Physical Exam   Genitourinary:   Genitourinary Comments: Minimal right testicular tenderness  No mass    No hernia

## 2018-08-13 ENCOUNTER — OFFICE VISIT (OUTPATIENT)
Dept: FAMILY MEDICINE CLINIC | Facility: CLINIC | Age: 68
End: 2018-08-13
Payer: COMMERCIAL

## 2018-08-13 VITALS
OXYGEN SATURATION: 95 % | TEMPERATURE: 97.4 F | HEART RATE: 76 BPM | SYSTOLIC BLOOD PRESSURE: 130 MMHG | DIASTOLIC BLOOD PRESSURE: 84 MMHG | BODY MASS INDEX: 39.83 KG/M2 | WEIGHT: 259.8 LBS

## 2018-08-13 DIAGNOSIS — I10 BENIGN ESSENTIAL HYPERTENSION: Primary | ICD-10-CM

## 2018-08-13 DIAGNOSIS — R73.9 ELEVATED BLOOD SUGAR: ICD-10-CM

## 2018-08-13 DIAGNOSIS — Z11.59 NEED FOR HEPATITIS C SCREENING TEST: ICD-10-CM

## 2018-08-13 DIAGNOSIS — E78.49 FAMILIAL COMBINED HYPERLIPIDEMIA: ICD-10-CM

## 2018-08-13 PROCEDURE — 3079F DIAST BP 80-89 MM HG: CPT | Performed by: FAMILY MEDICINE

## 2018-08-13 PROCEDURE — 4040F PNEUMOC VAC/ADMIN/RCVD: CPT | Performed by: FAMILY MEDICINE

## 2018-08-13 PROCEDURE — 1160F RVW MEDS BY RX/DR IN RCRD: CPT | Performed by: FAMILY MEDICINE

## 2018-08-13 PROCEDURE — 99214 OFFICE O/P EST MOD 30 MIN: CPT | Performed by: FAMILY MEDICINE

## 2018-08-13 PROCEDURE — 3075F SYST BP GE 130 - 139MM HG: CPT | Performed by: FAMILY MEDICINE

## 2018-08-13 PROCEDURE — 1036F TOBACCO NON-USER: CPT | Performed by: FAMILY MEDICINE

## 2018-08-13 NOTE — PROGRESS NOTES
Assessment/Plan:    Patient is 39-year-old male seen for follow-up of medical conditions  His blood pressure is stable  He did not have bloodwork recently and he was given orders for fasting blood work       Diagnoses and all orders for this visit:    Benign essential hypertension  -     CBC and differential; Future  -     Comprehensive metabolic panel; Future  -     TSH, 3rd generation with T4 reflex; Future    Familial combined hyperlipidemia  -     Lipid Panel with Direct LDL reflex; Future  -     CBC and differential; Future  -     Comprehensive metabolic panel; Future  -     TSH, 3rd generation with T4 reflex; Future    Elevated blood sugar  -     CBC and differential; Future  -     Comprehensive metabolic panel; Future  -     HEMOGLOBIN A1C W/ EAG ESTIMATION; Future  -     TSH, 3rd generation with T4 reflex; Future    Need for hepatitis C screening test  -     Hepatitis C antibody; Future          Subjective:   Chief Complaint   Patient presents with    Follow-up     6 months        Patient ID: Ivan Marcano is a 70 y o  male  Patient is here for follow up of hypertension and hyperlipidemia  The following portions of the patient's history were reviewed and updated as appropriate: allergies, current medications, past family history, past medical history, past social history, past surgical history and problem list     Review of Systems   Constitutional: Negative for chills and fever  HENT: Negative for congestion and sore throat  Respiratory: Negative for chest tightness  Cardiovascular: Negative for chest pain and palpitations  Gastrointestinal: Negative for abdominal pain, constipation, diarrhea and nausea  Genitourinary: Negative for difficulty urinating  Skin: Negative  Neurological: Negative for dizziness and headaches  Psychiatric/Behavioral: Negative            Objective:      /84 (BP Location: Left arm, Patient Position: Sitting)   Pulse 76   Temp (!) 97 4 °F (36 3 °C) (Tympanic)   Wt 118 kg (259 lb 12 8 oz)   SpO2 95%   BMI 39 83 kg/m²          Physical Exam   Constitutional: He is oriented to person, place, and time  He appears well-developed  No distress  Neck: Carotid bruit is not present  No thyromegaly present  Cardiovascular: Normal rate, regular rhythm and normal heart sounds  /78   Pulmonary/Chest: Effort normal and breath sounds normal    Musculoskeletal: He exhibits no edema  Lymphadenopathy:     He has no cervical adenopathy  Neurological: He is alert and oriented to person, place, and time  Skin: Skin is warm and dry  Psychiatric: He has a normal mood and affect  Nursing note and vitals reviewed

## 2018-08-15 DIAGNOSIS — E78.2 MIXED HYPERLIPIDEMIA: Primary | ICD-10-CM

## 2018-08-16 RX ORDER — ATORVASTATIN CALCIUM 10 MG/1
TABLET, FILM COATED ORAL
Qty: 90 TABLET | Refills: 1 | Status: SHIPPED | OUTPATIENT
Start: 2018-08-16 | End: 2019-03-03 | Stop reason: SDUPTHER

## 2018-08-17 DIAGNOSIS — I10 BENIGN ESSENTIAL HYPERTENSION: ICD-10-CM

## 2018-08-17 RX ORDER — LISINOPRIL 5 MG/1
TABLET ORAL
Qty: 90 TABLET | Refills: 0 | Status: SHIPPED | OUTPATIENT
Start: 2018-08-17 | End: 2018-11-17 | Stop reason: SDUPTHER

## 2018-08-30 LAB — HBA1C MFR BLD HPLC: 5.7 %

## 2018-09-27 DIAGNOSIS — I10 BENIGN ESSENTIAL HYPERTENSION: ICD-10-CM

## 2018-09-27 RX ORDER — AMLODIPINE BESYLATE 5 MG/1
TABLET ORAL
Qty: 90 TABLET | Refills: 1 | Status: SHIPPED | OUTPATIENT
Start: 2018-09-27 | End: 2019-06-28 | Stop reason: SDUPTHER

## 2018-11-17 DIAGNOSIS — I10 BENIGN ESSENTIAL HYPERTENSION: ICD-10-CM

## 2018-11-17 RX ORDER — LISINOPRIL 5 MG/1
TABLET ORAL
Qty: 90 TABLET | Refills: 0 | Status: SHIPPED | OUTPATIENT
Start: 2018-11-17 | End: 2019-05-01 | Stop reason: SDUPTHER

## 2019-02-18 ENCOUNTER — OFFICE VISIT (OUTPATIENT)
Dept: FAMILY MEDICINE CLINIC | Facility: CLINIC | Age: 69
End: 2019-02-18
Payer: COMMERCIAL

## 2019-02-18 VITALS
HEART RATE: 86 BPM | WEIGHT: 258.4 LBS | RESPIRATION RATE: 16 BRPM | BODY MASS INDEX: 39.62 KG/M2 | OXYGEN SATURATION: 98 % | DIASTOLIC BLOOD PRESSURE: 80 MMHG | TEMPERATURE: 98.2 F | SYSTOLIC BLOOD PRESSURE: 150 MMHG

## 2019-02-18 DIAGNOSIS — R10.31 RIGHT INGUINAL PAIN: Primary | ICD-10-CM

## 2019-02-18 PROCEDURE — 99213 OFFICE O/P EST LOW 20 MIN: CPT | Performed by: FAMILY MEDICINE

## 2019-02-18 PROCEDURE — 1160F RVW MEDS BY RX/DR IN RCRD: CPT | Performed by: FAMILY MEDICINE

## 2019-02-18 PROCEDURE — 1036F TOBACCO NON-USER: CPT | Performed by: FAMILY MEDICINE

## 2019-02-18 NOTE — PROGRESS NOTES
[facility]      NAME: Maru Redmond  AGE: 78 y o  SEX: male  : 1950   MRN: [id number]    DATE: 2019  TIME: 2:31 PM    Assessment and Plan     Problem List Items Addressed This Visit     None      Visit Diagnoses     Right inguinal pain    -  Primary        Right groin pain with normal exam   No urinary or GI symptoms associated  Recommend just observation and treat as musculoskeletal issue with Tylenol or Advil as needed  Return to office in:  P r n  Chief Complaint     Chief Complaint   Patient presents with    Groin Pain     Pt c/o pain on the R/groin area x5 days  Pt states he feels pain in his scrotum as well  He did say that the pain has subsided but would like to get checked anyway  History of Present Illness     Patient complains of some discomfort in the right upper thigh/groin area for the past 3-4 days though he states that in the last 24 hours it has improved significantly  He has no issues with urinating or moving his bowels  No fever or chills  The following portions of the patient's history were reviewed and updated as appropriate: allergies, current medications, past family history, past medical history, past social history, past surgical history and problem list     Review of Systems   Review of Systems   Constitutional: Negative  Respiratory: Negative  Cardiovascular: Negative  Gastrointestinal: Negative  Right lower groin tenderness   Genitourinary: Negative  Musculoskeletal: Negative  Psychiatric/Behavioral: Negative          Active Problem List     Patient Active Problem List   Diagnosis    Benign essential hypertension    Enlarged prostate without lower urinary tract symptoms (luts)    Familial combined hyperlipidemia    Elevated blood sugar       Objective   /80 (BP Location: Left arm, Patient Position: Sitting, Cuff Size: Adult)   Pulse 86   Temp 98 2 °F (36 8 °C) (Tympanic)   Resp 16   Wt 117 kg (258 lb 6 4 oz)   SpO2 98%   BMI 39 62 kg/m²     Physical Exam   Abdominal: Soft  Bowel sounds are normal  He exhibits no mass  There is no tenderness  There is no guarding  No hernia  Genitourinary:   Genitourinary Comments: No testicular abnormality         Current Medications     Current Outpatient Medications:     amLODIPine (NORVASC) 5 mg tablet, TAKE 1 TABLET BY MOUTH DAILY, Disp: 90 tablet, Rfl: 1    atorvastatin (LIPITOR) 10 mg tablet, TAKE 1 TABLET DAILY, Disp: 90 tablet, Rfl: 1    buPROPion (WELLBUTRIN) 75 mg tablet, Take 1 tablet by mouth 2 (two) times a day, Disp: , Rfl:     lisinopril (ZESTRIL) 5 mg tablet, TAKE 1 TABLET DAILY  , Disp: 90 tablet, Rfl: 0    Health Maintenance     Health Maintenance   Topic Date Due    Hepatitis C Screening  1950    Medicare Annual Wellness Visit (AWV)  1950    BMI: Adult  02/10/1967    INFLUENZA VACCINE  07/01/2018    Fall Risk  12/27/2018    Depression Screening PHQ  12/27/2018    CRC Screening: Colonoscopy  09/09/2019    DTaP,Tdap,and Td Vaccines (2 - Td) 04/07/2027    Pneumococcal PPSV23/PCV13 65+ Years / Low and Medium Risk  Completed    HEPATITIS B VACCINES  Aged Out     Immunization History   Administered Date(s) Administered    INFLUENZA 03/22/2014, 09/18/2015, 12/08/2017    Influenza Split High Dose Preservative Free IM 09/18/2015, 11/20/2016, 12/08/2017    Pneumococcal Conjugate 13-Valent 07/16/2015    Pneumococcal Polysaccharide PPV23 12/22/2017    Td (adult), adsorbed 11/30/2015    Tdap 04/07/2017       Fay Sanabria DO  [facility]

## 2019-03-03 ENCOUNTER — TELEPHONE (OUTPATIENT)
Dept: FAMILY MEDICINE CLINIC | Facility: CLINIC | Age: 69
End: 2019-03-03

## 2019-03-03 DIAGNOSIS — E78.2 MIXED HYPERLIPIDEMIA: ICD-10-CM

## 2019-03-04 RX ORDER — ATORVASTATIN CALCIUM 10 MG/1
TABLET, FILM COATED ORAL
Qty: 90 TABLET | Refills: 1 | Status: SHIPPED | OUTPATIENT
Start: 2019-03-04 | End: 2020-02-20

## 2019-03-22 ENCOUNTER — CLINICAL SUPPORT (OUTPATIENT)
Dept: FAMILY MEDICINE CLINIC | Facility: CLINIC | Age: 69
End: 2019-03-22

## 2019-03-22 DIAGNOSIS — I10 BENIGN ESSENTIAL HYPERTENSION: ICD-10-CM

## 2019-03-22 DIAGNOSIS — E78.49 FAMILIAL COMBINED HYPERLIPIDEMIA: ICD-10-CM

## 2019-03-22 DIAGNOSIS — R73.9 ELEVATED BLOOD SUGAR: ICD-10-CM

## 2019-03-22 PROCEDURE — 83036 HEMOGLOBIN GLYCOSYLATED A1C: CPT | Performed by: FAMILY MEDICINE

## 2019-03-22 PROCEDURE — 86803 HEPATITIS C AB TEST: CPT | Performed by: FAMILY MEDICINE

## 2019-03-22 PROCEDURE — 80053 COMPREHEN METABOLIC PANEL: CPT | Performed by: FAMILY MEDICINE

## 2019-03-22 PROCEDURE — 85025 COMPLETE CBC W/AUTO DIFF WBC: CPT | Performed by: FAMILY MEDICINE

## 2019-03-22 PROCEDURE — 84443 ASSAY THYROID STIM HORMONE: CPT | Performed by: FAMILY MEDICINE

## 2019-03-22 PROCEDURE — 80061 LIPID PANEL: CPT | Performed by: FAMILY MEDICINE

## 2019-04-15 ENCOUNTER — OFFICE VISIT (OUTPATIENT)
Dept: FAMILY MEDICINE CLINIC | Facility: CLINIC | Age: 69
End: 2019-04-15
Payer: COMMERCIAL

## 2019-04-15 VITALS
TEMPERATURE: 97.8 F | HEART RATE: 72 BPM | WEIGHT: 256.3 LBS | SYSTOLIC BLOOD PRESSURE: 130 MMHG | RESPIRATION RATE: 17 BRPM | BODY MASS INDEX: 39.3 KG/M2 | OXYGEN SATURATION: 98 % | DIASTOLIC BLOOD PRESSURE: 82 MMHG

## 2019-04-15 DIAGNOSIS — R73.9 ELEVATED BLOOD SUGAR: ICD-10-CM

## 2019-04-15 DIAGNOSIS — I10 BENIGN ESSENTIAL HYPERTENSION: ICD-10-CM

## 2019-04-15 DIAGNOSIS — E78.49 FAMILIAL COMBINED HYPERLIPIDEMIA: ICD-10-CM

## 2019-04-15 DIAGNOSIS — E04.9 ENLARGED THYROID: Primary | ICD-10-CM

## 2019-04-15 PROCEDURE — 99214 OFFICE O/P EST MOD 30 MIN: CPT | Performed by: FAMILY MEDICINE

## 2019-04-15 RX ORDER — DOXYCYCLINE HYCLATE 100 MG/1
CAPSULE ORAL
COMMUNITY
Start: 2019-04-01

## 2019-04-15 RX ORDER — FLUTICASONE PROPIONATE 50 MCG
SPRAY, SUSPENSION (ML) NASAL
COMMUNITY
Start: 2019-04-01

## 2019-05-01 DIAGNOSIS — I10 BENIGN ESSENTIAL HYPERTENSION: ICD-10-CM

## 2019-05-02 RX ORDER — LISINOPRIL 5 MG/1
TABLET ORAL
Qty: 90 TABLET | Refills: 0 | Status: SHIPPED | OUTPATIENT
Start: 2019-05-02 | End: 2019-09-28 | Stop reason: SDUPTHER

## 2019-05-13 ENCOUNTER — HOSPITAL ENCOUNTER (OUTPATIENT)
Dept: ULTRASOUND IMAGING | Facility: MEDICAL CENTER | Age: 69
Discharge: HOME | End: 2019-05-13
Payer: COMMERCIAL

## 2019-05-13 DIAGNOSIS — E04.9 ENLARGED THYROID: ICD-10-CM

## 2019-05-13 PROCEDURE — 76536 US EXAM OF HEAD AND NECK: CPT

## 2019-05-15 ENCOUNTER — TELEPHONE (OUTPATIENT)
Dept: FAMILY MEDICINE CLINIC | Facility: CLINIC | Age: 69
End: 2019-05-15

## 2019-06-28 DIAGNOSIS — I10 BENIGN ESSENTIAL HYPERTENSION: ICD-10-CM

## 2019-06-28 RX ORDER — AMLODIPINE BESYLATE 5 MG/1
TABLET ORAL
Qty: 90 TABLET | Refills: 1 | Status: SHIPPED | OUTPATIENT
Start: 2019-06-28 | End: 2020-03-14

## 2019-09-28 DIAGNOSIS — I10 BENIGN ESSENTIAL HYPERTENSION: ICD-10-CM

## 2019-09-29 RX ORDER — LISINOPRIL 5 MG/1
TABLET ORAL
Qty: 90 TABLET | Refills: 0 | Status: SHIPPED | OUTPATIENT
Start: 2019-09-29 | End: 2019-12-29 | Stop reason: SDUPTHER

## 2019-10-22 ENCOUNTER — OFFICE VISIT (OUTPATIENT)
Dept: FAMILY MEDICINE CLINIC | Facility: CLINIC | Age: 69
End: 2019-10-22
Payer: COMMERCIAL

## 2019-10-22 VITALS
HEART RATE: 100 BPM | BODY MASS INDEX: 41.74 KG/M2 | HEIGHT: 68 IN | TEMPERATURE: 98 F | RESPIRATION RATE: 18 BRPM | WEIGHT: 275.4 LBS | DIASTOLIC BLOOD PRESSURE: 84 MMHG | OXYGEN SATURATION: 97 % | SYSTOLIC BLOOD PRESSURE: 146 MMHG

## 2019-10-22 DIAGNOSIS — E78.49 FAMILIAL COMBINED HYPERLIPIDEMIA: ICD-10-CM

## 2019-10-22 DIAGNOSIS — R73.9 ELEVATED BLOOD SUGAR: ICD-10-CM

## 2019-10-22 DIAGNOSIS — I10 BENIGN ESSENTIAL HYPERTENSION: Primary | ICD-10-CM

## 2019-10-22 DIAGNOSIS — Z23 NEED FOR IMMUNIZATION AGAINST INFLUENZA: ICD-10-CM

## 2019-10-22 DIAGNOSIS — Z23 NEED FOR SHINGLES VACCINE: ICD-10-CM

## 2019-10-22 DIAGNOSIS — E66.01 MORBID OBESITY WITH BMI OF 40.0-44.9, ADULT (HCC): ICD-10-CM

## 2019-10-22 DIAGNOSIS — Z00.00 MEDICARE ANNUAL WELLNESS VISIT, SUBSEQUENT: ICD-10-CM

## 2019-10-22 DIAGNOSIS — R35.1 NOCTURIA: ICD-10-CM

## 2019-10-22 DIAGNOSIS — Z12.11 COLON CANCER SCREENING: ICD-10-CM

## 2019-10-22 PROCEDURE — 3008F BODY MASS INDEX DOCD: CPT | Performed by: FAMILY MEDICINE

## 2019-10-22 PROCEDURE — 1170F FXNL STATUS ASSESSED: CPT | Performed by: FAMILY MEDICINE

## 2019-10-22 PROCEDURE — G0008 ADMIN INFLUENZA VIRUS VAC: HCPCS | Performed by: FAMILY MEDICINE

## 2019-10-22 PROCEDURE — 90662 IIV NO PRSV INCREASED AG IM: CPT | Performed by: FAMILY MEDICINE

## 2019-10-22 PROCEDURE — 99214 OFFICE O/P EST MOD 30 MIN: CPT | Performed by: FAMILY MEDICINE

## 2019-10-22 PROCEDURE — G0439 PPPS, SUBSEQ VISIT: HCPCS | Performed by: FAMILY MEDICINE

## 2019-10-22 PROCEDURE — 1125F AMNT PAIN NOTED PAIN PRSNT: CPT | Performed by: FAMILY MEDICINE

## 2019-10-22 NOTE — PROGRESS NOTES
Assessment and Plan:   Patient is here for subsequent Medicare visit  Problem List Items Addressed This Visit        Cardiovascular and Mediastinum    Benign essential hypertension - Primary    Relevant Orders    Comprehensive metabolic panel       Other    Familial combined hyperlipidemia    Relevant Orders    Lipid Panel with Direct LDL reflex    Elevated blood sugar    Relevant Orders    HEMOGLOBIN A1C W/ EAG ESTIMATION      Other Visit Diagnoses     Need for immunization against influenza        Relevant Orders    influenza vaccine, [id number], high-dose, PF 0 5 mL (FLUZONE HIGH-DOSE) (Completed)    Medicare annual wellness visit, subsequent        Need for shingles vaccine        Colon cancer screening        Relevant Orders    Ambulatory referral to colonoscopy screening    Morbid obesity with BMI of 40 0-44 9, adult ([facility] [address] )        Nocturia        Relevant Orders    PSA, Total Screen           Preventive health issues were discussed with patient, and age appropriate screening tests were ordered as noted in patient's After Visit Summary  Personalized health advice and appropriate referrals for health education or preventive services given if needed, as noted in patient's After Visit Summary  History of Present Illness:     Patient presents for  Subsequent Medicare visit  Patient Care Team:  Kimani Aiken DO as PCP - General     Review of Systems:        Problem List:     Patient Active Problem List   Diagnosis    Benign essential hypertension    Enlarged prostate without lower urinary tract symptoms (luts)    Familial combined hyperlipidemia    Elevated blood sugar      Past Medical and Surgical History:     Past Medical History:   Diagnosis Date    Abnormal blood chemistry     Last assessed: 11/5/13    Sciatica     Last assessed: 4/2/14     History reviewed  No pertinent surgical history     Family History:     Family History   Problem Relation Age of Onset    No Known Problems Mother     No Known Problems Father       Social History:     Social History     Socioeconomic History    Marital status: /Civil Union     Spouse name: None    Number of children: None    Years of education: None    Highest education level: None   Occupational History    None   Social Needs    Financial resource strain: None    Food insecurity:     Worry: None     Inability: None    Transportation needs:     Medical: None     Non-medical: None   Tobacco Use    Smoking status: Never Smoker    Smokeless tobacco: Never Used    Tobacco comment: Per [employer]: Smoking trying to quit   Substance and Sexual Activity    Alcohol use: Yes     Comment: Social    Drug use: No    Sexual activity: None   Lifestyle    Physical activity:     Days per week: None     Minutes per session: None    Stress: None   Relationships    Social connections:     Talks on phone: None     Gets together: None     Attends Buddhist service: None     Active member of club or organization: None     Attends meetings of clubs or organizations: None     Relationship status: None    Intimate partner violence:     Fear of current or ex partner: None     Emotionally abused: None     Physically abused: None     Forced sexual activity: None   Other Topics Concern    None   Social History Narrative    Always uses seatbelt    Feels safe at hiome      Medications and Allergies:     Current Outpatient Medications   Medication Sig Dispense Refill    amLODIPine (NORVASC) 5 mg tablet TAKE 1 TABLET BY MOUTH DAILY 90 tablet 1    atorvastatin (LIPITOR) 10 mg tablet TAKE 1 TABLET DAILY 90 tablet 1    lisinopril (ZESTRIL) 5 mg tablet TAKE 1 TABLET DAILY  90 tablet 0    buPROPion (WELLBUTRIN) 75 mg tablet Take 1 tablet by mouth 2 (two) times a day      doxycycline hyclate (VIBRAMYCIN) 100 mg capsule       fluticasone (FLONASE) 50 mcg/act nasal spray        No current facility-administered medications for this visit        No Known Allergies   Immunizations: Immunization History   Administered Date(s) Administered    INFLUENZA 03/22/2014, 09/18/2015, 12/08/2017, 11/07/2018    Influenza Split High Dose Preservative Free IM 09/18/2015, 11/20/2016, 12/08/2017    Influenza, high dose seasonal 0 5 mL 10/22/2019    Pneumococcal Conjugate 13-Valent 07/16/2015    Pneumococcal Polysaccharide PPV23 12/22/2017    Td (adult), adsorbed 11/30/2015    Tdap 04/07/2017      Health Maintenance:         Topic Date Due    CRC Screening: Colonoscopy  09/09/2019    Hepatitis C Screening  Completed     There are no preventive care reminders to display for this patient  Medicare Screening Tests and Risk Assessments:     Gustavo Beatty is here for his Subsequent Wellness visit  Last Medicare Wellness visit information reviewed, patient interviewed, no change since last AWV  Health Risk Assessment:   Patient rates overall health as very good  Patient feels that their physical health rating is same  Eyesight was rated as slightly worse  Hearing was rated as same  Patient feels that their emotional and mental health rating is slightly better  Pain experienced in the last 7 days has been none  Patient states that he has experienced no weight loss or gain in last 6 months  Fall Risk Screening: In the past year, patient has experienced: no history of falling in past year      Home Safety:  Patient does not have trouble with stairs inside or outside of their home  Patient has working smoke alarms and has working carbon monoxide detector  Home safety hazards include: none  Nutrition:   Current diet is Regular, No Added Salt and Limited junk food  Medications:   Patient is currently taking over-the-counter supplements  OTC medications include: see medication list  Patient is able to manage medications       Activities of Daily Living (ADLs)/Instrumental Activities of Daily Living (IADLs):   Walk and transfer into and out of bed and chair?: Yes  Dress and groom yourself?: Yes Bathe or shower yourself?: Yes    Feed yourself?  Yes  Do your laundry/housekeeping?: Yes  Manage your money, pay your bills and track your expenses?: Yes  Make your own meals?: Yes    Do your own shopping?: Yes    Previous Hospitalizations:   Any hospitalizations or ED visits within the last 12 months?: No      Advance Care Planning:   Living will: No    Durable POA for healthcare: No    Advanced directive: No    Advanced directive counseling given: Yes    End of Life Decisions reviewed with patient: No      Cognitive Screening:   Provider or family/friend/caregiver concerned regarding cognition?: No    PREVENTIVE SCREENINGS      Cardiovascular Screening:    General: Screening Not Indicated and History Lipid Disorder      Diabetes Screening:     General: Screening Current      Osteoporosis Screening:    General: Screening Not Indicated      Abdominal Aortic Aneurysm (AAA) Screening:    Risk factors include: age between 73-67 yo        Lung Cancer Screening:     General: Screening Not Indicated      Hepatitis C Screening:    General: Screening Current    No exam data present     Physical Exam:     /84 (BP Location: Left arm, Patient Position: Sitting, Cuff Size: Large)   Pulse 100   Temp 98 °F (36 7 °C) (Tympanic)   Resp 18   Ht 5' 7 84" (1 723 m)   Wt 125 kg (275 lb 6 4 oz)   SpO2 97%   BMI 42 08 kg/m²       Josh Cope,

## 2019-10-22 NOTE — PATIENT INSTRUCTIONS
Medicare Preventive Visit Patient Instructions  Thank you for completing your Welcome to Medicare Visit or Medicare Annual Wellness Visit today  Your next wellness visit will be due in one year (10/22/2020)  The screening/preventive services that you may require over the next 5-10 years are detailed below  Some tests may not apply to you based off risk factors and/or age  Screening tests ordered at today's visit but not completed yet may show as past due  Also, please note that scanned in results may not display below  Preventive Screenings:  Service Recommendations Previous Testing/Comments   Colorectal Cancer Screening  · Colonoscopy    · Fecal Occult Blood Test (FOBT)/Fecal Immunochemical Test (FIT)  · Fecal DNA/Cologuard Test  · Flexible Sigmoidoscopy Age: 54-65 years old   Colonoscopy: every 10 years (May be performed more frequently if at higher risk)  OR  FOBT/FIT: every 1 year  OR  Cologuard: every 3 years  OR  Sigmoidoscopy: every 5 years  Screening may be recommended earlier than age 48 if at higher risk for colorectal cancer  Also, an individualized decision between you and your healthcare provider will decide whether screening between the ages of 74-80 would be appropriate  Colonoscopy: 09/09/2009  FOBT/FIT: Not on file  Cologuard: Not on file  Sigmoidoscopy: Not on file         Prostate Cancer Screening Individualized decision between patient and health care provider in men between ages of 53-78   Medicare will cover every 12 months beginning on the day after your 50th birthday PSA: 0 7 ng/mL          Hepatitis C Screening Once for adults born between 1945 and 1965  More frequently in patients at high risk for Hepatitis C Hep C Antibody: 03/22/2019    Screening Current   Diabetes Screening 1-2 times per year if you're at risk for diabetes or have pre-diabetes Fasting glucose: 107 mg/dL   A1C: 5 9 %    Screening Current   Cholesterol Screening Once every 5 years if you don't have a lipid disorder  May order more often based on risk factors  Lipid panel: 03/22/2019    Screening Not Indicated  History Lipid Disorder      Other Preventive Screenings Covered by Medicare:  1  Abdominal Aortic Aneurysm (AAA) Screening: covered once if your at risk  You're considered to be at risk if you have a family history of AAA or a male between the age of 73-68 who smoking at least 100 cigarettes in your lifetime  2  Lung Cancer Screening: covers low dose CT scan once per year if you meet all of the following conditions: (1) Age 50-69; (2) No signs or symptoms of lung cancer; (3) Current smoker or have quit smoking within the last 15 years; (4) You have a tobacco smoking history of at least 30 pack years (packs per day x number of years you smoked); (5) You get a written order from a healthcare provider  3  Glaucoma Screening: covered annually if you're considered high risk: (1) You have diabetes OR (2) Family history of glaucoma OR (3)  aged 48 and older OR (3)  American aged 72 and older  3  Osteoporosis Screening: covered every 2 years if you meet one of the following conditions: (1) Have a vertebral abnormality; (2) On glucocorticoid therapy for more than 3 months; (3) Have primary hyperparathyroidism; (4) On osteoporosis medications and need to assess response to drug therapy  5  HIV Screening: covered annually if you're between the age of 12-76  Also covered annually if you are younger than 13 and older than 72 with risk factors for HIV infection  For pregnant patients, it is covered up to 3 times per pregnancy      Immunizations:  Immunization Recommendations   Influenza Vaccine Annual influenza vaccination during flu season is recommended for all persons aged >= 6 months who do not have contraindications   Pneumococcal Vaccine (Prevnar and Pneumovax)  * Prevnar = PCV13  * Pneumovax = PPSV23 Adults 25-60 years old: 1-3 doses may be recommended based on certain risk factors  Adults 65+ years old: Prevnar (PCV13) vaccine recommended followed by Pneumovax (PPSV23) vaccine  If already received PPSV23 since turning 65, then PCV13 recommended at least one year after PPSV23 dose  Hepatitis B Vaccine 3 dose series if at intermediate or high risk (ex: diabetes, end stage renal disease, liver disease)   Tetanus (Td) Vaccine - COST NOT COVERED BY MEDICARE PART B Following completion of primary series, a booster dose should be given every 10 years to maintain immunity against tetanus  Td may also be given as tetanus wound prophylaxis  Tdap Vaccine - COST NOT COVERED BY MEDICARE PART B Recommended at least once for all adults  For pregnant patients, recommended with each pregnancy  Shingles Vaccine (Shingrix) - COST NOT COVERED BY MEDICARE PART B  2 shot series recommended in those aged 48 and above     Health Maintenance Due:      Topic Date Due    CRC Screening: Colonoscopy  09/09/2019    Hepatitis C Screening  Completed     Immunizations Due:      Topic Date Due    INFLUENZA VACCINE  07/01/2019     Advance Directives   What are advance directives? Advance directives are legal documents that state your wishes and plans for medical care  These plans are made ahead of time in case you lose your ability to make decisions for yourself  Advance directives can apply to any medical decision, such as the treatments you want, and if you want to donate organs  What are the types of advance directives? There are many types of advance directives, and each state has rules about how to use them  You may choose a combination of any of the following:  · Living will: This is a written record of the treatment you want  You can also choose which treatments you do not want, which to limit, and which to stop at a certain time  This includes surgery, medicine, IV fluid, and tube feedings  · Durable power of  for healthcare [facility]):   This is a written record that states who you want to make healthcare choices for you when you are unable to make them for yourself  This person, called a proxy, is usually a family member or a friend  You may choose more than 1 proxy  · Do not resuscitate (DNR) order:  A DNR order is used in case your heart stops beating or you stop breathing  It is a request not to have certain forms of treatment, such as CPR  A DNR order may be included in other types of advance directives  · Medical directive: This covers the care that you want if you are in a coma, near death, or unable to make decisions for yourself  You can list the treatments you want for each condition  Treatment may include pain medicine, surgery, blood transfusions, dialysis, IV or tube feedings, and a ventilator (breathing machine)  · Values history: This document has questions about your views, beliefs, and how you feel and think about life  This information can help others choose the care that you would choose  Why are advance directives important? An advance directive helps you control your care  Although spoken wishes may be used, it is better to have your wishes written down  Spoken wishes can be misunderstood, or not followed  Treatments may be given even if you do not want them  An advance directive may make it easier for your family to make difficult choices about your care  © Copyright [employer] 2018 Information is for End User's use only and may not be sold, redistributed or otherwise used for commercial purposes   All illustrations and images included in CareNotes® are the copyrighted property of [employer]  or [address] [place] [address] [email or web address]

## 2019-10-22 NOTE — PROGRESS NOTES
Assessment/Plan:   patient is 76-year-old male seen for follow-up of chronic medical conditions  1  Benign essential hypertension    Blood pressure stable on amlodipine and lisinopril  - Comprehensive metabolic panel; Future    2  Familial combined hyperlipidemia    Patient has not had recent fasting blood work  - Lipid Panel with Direct LDL reflex; Future    3  Elevated blood sugar    Explained my concern that patient has gained a great deal of weight since his last visit  And this could affect his blood  Sugar  He he needs fasting blood work  - HEMOGLOBIN A1C W/ EAG ESTIMATION; Future    4  Need for immunization against influenza   given at visit  - influenza vaccine, [id number], high-dose, PF 0 5 mL (FLUZONE HIGH-DOSE)    5  Medicare annual wellness visit, subsequent    See separate note  6  Need for shingles vaccine    Discussed  - Zoster Vac Recomb Adjuvanted (SHINGRIX) 50 MCG/0 5ML SUSR; Inject 0 5 mL into a muscle once for 1 dose Repeat dose in 2 to 6 months  Dispense: 1 each; Refill: 1    7  Colon cancer screening   it appears the patient has not had a colonoscopy since 2009  - Ambulatory referral to colonoscopy screening; Future    8  Morbid obesity with BMI of 40 0-44 9, adult [facility])   patient has retired and has gained weight being at home  I discussed need to be more active  9  Nocturia    Wife is concerned about prostate and would like a PSA drawn   - PSA, Total Screen; Future       Diagnoses and all orders for this visit:    Need for immunization against influenza  -     influenza vaccine, [id number], high-dose, PF 0 5 mL (FLUZONE HIGH-DOSE)    Benign essential hypertension  -     Comprehensive metabolic panel; Future    Familial combined hyperlipidemia  -     Lipid Panel with Direct LDL reflex; Future    Elevated blood sugar  -     HEMOGLOBIN A1C W/ EAG ESTIMATION;  Future    Medicare annual wellness visit, subsequent    Need for shingles vaccine  -     Zoster Vac Recomb Adjuvanted [facility]) 50 MCG/0 5ML SUSR; Inject 0 5 mL into a muscle once for 1 dose Repeat dose in 2 to 6 months    Colon cancer screening  -     Ambulatory referral to colonoscopy screening; Future    Morbid obesity with BMI of 40 0-44 9, adult (HCC)    Nocturia  -     PSA, Total Screen; Future          Subjective:   Chief Complaint   Patient presents with    Follow-up     Pt presents for a routine f/u   Medicare Wellness Visit     Pt presents for a Subsequent Medicare Annual Wellness Exam          Patient ID: Aidan De Guzman is a 78 y o  male  Patient is 50-year-old male seen for follow-up of chronic medical illnesses  He is accompanied by his wife  He has not had fasting blood work recently  He has no new concerns today  The following portions of the patient's history were reviewed and updated as appropriate: allergies, current medications, past family history, past medical history, past social history, past surgical history and problem list     Review of Systems   Constitutional: Negative for chills and fever  HENT: Negative for congestion and sore throat  Respiratory: Negative for chest tightness  Cardiovascular: Negative for chest pain and palpitations  Gastrointestinal: Negative for abdominal pain, constipation, diarrhea and nausea  Genitourinary: Negative for difficulty urinating  Skin: Negative  Neurological: Negative for dizziness and headaches  Psychiatric/Behavioral: Negative  Objective:      /84 (BP Location: Left arm, Patient Position: Sitting, Cuff Size: Large)   Pulse 100   Temp 98 °F (36 7 °C) (Tympanic)   Resp 18   Ht 5' 7 84" (1 723 m)   Wt 125 kg (275 lb 6 4 oz)   SpO2 97%   BMI 42 08 kg/m²          Physical Exam   Constitutional: He is oriented to person, place, and time  He appears well-nourished  Obese   HENT:   Head: Normocephalic     Right Ear: Tympanic membrane normal    Left Ear: Tympanic membrane normal    Nose: Nose normal  Mouth/Throat: Oropharynx is clear and moist and mucous membranes are normal    Neck: No thyromegaly present  Cardiovascular: Normal rate, regular rhythm, normal heart sounds and intact distal pulses  Pulmonary/Chest: Effort normal and breath sounds normal    Musculoskeletal: Normal range of motion  He exhibits no edema  Lymphadenopathy:     He has no cervical adenopathy  Neurological: He is alert and oriented to person, place, and time  He has normal reflexes  Skin: Skin is warm and dry  Psychiatric: He has a normal mood and affect  Nursing note and vitals reviewed  BMI Counseling: Body mass index is 42 08 kg/m²  The BMI is above normal  Nutrition recommendations include reducing portion sizes, moderation in carbohydrate intake, increasing intake of lean protein and reducing intake of saturated fat and trans fat  Current Outpatient Medications:     amLODIPine (NORVASC) 5 mg tablet, TAKE 1 TABLET BY MOUTH DAILY, Disp: 90 tablet, Rfl: 1    atorvastatin (LIPITOR) 10 mg tablet, TAKE 1 TABLET DAILY, Disp: 90 tablet, Rfl: 1    lisinopril (ZESTRIL) 5 mg tablet, TAKE 1 TABLET DAILY  , Disp: 90 tablet, Rfl: 0    buPROPion (WELLBUTRIN) 75 mg tablet, Take 1 tablet by mouth 2 (two) times a day, Disp: , Rfl:     doxycycline hyclate (VIBRAMYCIN) 100 mg capsule, , Disp: , Rfl:     fluticasone (FLONASE) 50 mcg/act nasal spray, , Disp: , Rfl:

## 2019-11-06 LAB — HBA1C MFR BLD HPLC: 6.3 %

## 2019-11-21 ENCOUNTER — OFFICE VISIT (OUTPATIENT)
Dept: FAMILY MEDICINE CLINIC | Facility: CLINIC | Age: 69
End: 2019-11-21
Payer: COMMERCIAL

## 2019-11-21 VITALS
DIASTOLIC BLOOD PRESSURE: 86 MMHG | SYSTOLIC BLOOD PRESSURE: 144 MMHG | HEART RATE: 96 BPM | BODY MASS INDEX: 41.77 KG/M2 | WEIGHT: 275.6 LBS | HEIGHT: 68 IN | TEMPERATURE: 97.8 F | OXYGEN SATURATION: 98 % | RESPIRATION RATE: 17 BRPM

## 2019-11-21 DIAGNOSIS — E66.01 MORBID OBESITY (HCC): ICD-10-CM

## 2019-11-21 DIAGNOSIS — R73.9 ELEVATED BLOOD SUGAR: Primary | ICD-10-CM

## 2019-11-21 PROCEDURE — 99214 OFFICE O/P EST MOD 30 MIN: CPT | Performed by: FAMILY MEDICINE

## 2019-11-21 PROCEDURE — 1160F RVW MEDS BY RX/DR IN RCRD: CPT | Performed by: FAMILY MEDICINE

## 2019-11-21 RX ORDER — METFORMIN HYDROCHLORIDE 500 MG/1
500 TABLET, EXTENDED RELEASE ORAL
Qty: 90 TABLET | Refills: 1 | Status: SHIPPED | OUTPATIENT
Start: 2019-11-21

## 2019-11-21 NOTE — PROGRESS NOTES
Assessment/Plan:   patient is 71-year-old male seen for follow-up to abnormal blood work  His blood sugar was over 140  His hemoglobin A1c was 6 3  His sugar has been mildly elevated in the past and his hemoglobin A1c has been up to 6 2  He has retired recently and has put on a great deal of weight  I discussed diet with his wife and patient and also starting patient on metformin  I did discuss that if he can lose weight and watch his diet better and increase his exercise he may be able to stop medication  I recommended that he go to diabetic education classes  They do not live in the area and will look for classes closer to [place]  I would like him to be checking his blood sugars once a day or 2 times a day 3 days a week  I will see him back in 3 months  Diagnoses and all orders for this visit:    Elevated blood sugar  -     metFORMIN (GLUCOPHAGE-XR) 500 mg 24 hr tablet; Take 1 tablet (500 mg total) by mouth daily with dinner  -     HEMOGLOBIN A1C W/ EAG ESTIMATION; Future  -     Microalbumin / creatinine urine ratio; Future  -     Comprehensive metabolic panel; Future          Subjective:   Chief Complaint   Patient presents with    Follow-up     BW        Patient ID: Edmond Werner is a 78 y o  male  Patient is 71-year-old male seen for follow-up to abnormal blood work  His blood sugar was greater than 140 although his hemoglobin A1c was 6 3  The following portions of the patient's history were reviewed and updated as appropriate: allergies, current medications, past family history, past medical history, past social history, past surgical history and problem list     Review of Systems   Constitutional: Negative for chills and fever  HENT: Negative for congestion and sore throat  Respiratory: Negative for chest tightness  Cardiovascular: Negative for chest pain and palpitations  Gastrointestinal: Negative for abdominal pain, constipation, diarrhea and nausea  Genitourinary: Negative for difficulty urinating  Skin: Negative  Neurological: Negative for dizziness and headaches  Psychiatric/Behavioral: Negative  Objective:      /86 (BP Location: Left arm, Patient Position: Sitting, Cuff Size: Large)   Pulse 96   Temp 97 8 °F (36 6 °C) (Tympanic)   Resp 17   Ht 5' 8" (1 727 m)   Wt 125 kg (275 lb 9 6 oz)   SpO2 98%   BMI 41 90 kg/m²          Physical Exam   Constitutional: He is oriented to person, place, and time  No distress  Obese   Neck: No thyromegaly present  Cardiovascular: Normal rate and regular rhythm  Pulmonary/Chest: Effort normal and breath sounds normal    Lymphadenopathy:     He has no cervical adenopathy  Neurological: He is alert and oriented to person, place, and time  Skin: Skin is warm and dry  Psychiatric: He has a normal mood and affect  Nursing note and vitals reviewed

## 2019-12-13 ENCOUNTER — TELEPHONE (OUTPATIENT)
Dept: GASTROENTEROLOGY | Facility: MEDICAL CENTER | Age: 69
End: 2019-12-13

## 2019-12-29 DIAGNOSIS — I10 BENIGN ESSENTIAL HYPERTENSION: ICD-10-CM

## 2019-12-29 RX ORDER — LISINOPRIL 5 MG/1
TABLET ORAL
Qty: 90 TABLET | Refills: 0 | Status: SHIPPED | OUTPATIENT
Start: 2019-12-29

## 2020-02-20 DIAGNOSIS — E78.2 MIXED HYPERLIPIDEMIA: ICD-10-CM

## 2020-02-20 RX ORDER — ATORVASTATIN CALCIUM 10 MG/1
TABLET, FILM COATED ORAL
Qty: 90 TABLET | Refills: 1 | Status: SHIPPED | OUTPATIENT
Start: 2020-02-20

## 2020-03-13 DIAGNOSIS — I10 BENIGN ESSENTIAL HYPERTENSION: ICD-10-CM

## 2020-03-14 RX ORDER — AMLODIPINE BESYLATE 5 MG/1
TABLET ORAL
Qty: 90 TABLET | Refills: 0 | Status: SHIPPED | OUTPATIENT
Start: 2020-03-14 | End: 2020-03-30

## 2020-03-29 DIAGNOSIS — I10 BENIGN ESSENTIAL HYPERTENSION: ICD-10-CM

## 2020-03-30 RX ORDER — AMLODIPINE BESYLATE 5 MG/1
TABLET ORAL
Qty: 90 TABLET | Refills: 0 | Status: SHIPPED | OUTPATIENT
Start: 2020-03-30

## 2020-04-16 ENCOUNTER — TELEPHONE (OUTPATIENT)
Dept: FAMILY MEDICINE CLINIC | Facility: CLINIC | Age: 70
End: 2020-04-16

## 2020-05-05 LAB
CREAT ?TM UR-SCNC: 154.28 UMOL/L
EXT PROTEIN URINE: 0.7
HBA1C MFR BLD HPLC: 6.2 %
PROT/CREAT UR: <29.9 MG/G{CREAT}

## 2020-11-04 ENCOUNTER — VBI (OUTPATIENT)
Dept: ADMINISTRATIVE | Facility: OTHER | Age: 70
End: 2020-11-04

## 2020-11-06 ENCOUNTER — VBI (OUTPATIENT)
Dept: ADMINISTRATIVE | Facility: OTHER | Age: 70
End: 2020-11-06

## 2020-12-11 ENCOUNTER — VBI (OUTPATIENT)
Dept: ADMINISTRATIVE | Facility: OTHER | Age: 70
End: 2020-12-11

## 2020-12-15 ENCOUNTER — VBI (OUTPATIENT)
Dept: ADMINISTRATIVE | Facility: OTHER | Age: 70
End: 2020-12-15

## 2020-12-18 ENCOUNTER — VBI (OUTPATIENT)
Dept: ADMINISTRATIVE | Facility: OTHER | Age: 70
End: 2020-12-18